# Patient Record
Sex: FEMALE | Race: WHITE | Employment: OTHER | URBAN - METROPOLITAN AREA
[De-identification: names, ages, dates, MRNs, and addresses within clinical notes are randomized per-mention and may not be internally consistent; named-entity substitution may affect disease eponyms.]

---

## 2022-07-21 ENCOUNTER — HOSPITAL ENCOUNTER (EMERGENCY)
Age: 87
Discharge: HOME | End: 2022-07-21
Attending: EMERGENCY MEDICINE
Payer: MEDICARE

## 2022-07-21 ENCOUNTER — APPOINTMENT (OUTPATIENT)
Dept: CT IMAGING | Age: 87
End: 2022-07-21
Payer: MEDICARE

## 2022-07-21 ENCOUNTER — APPOINTMENT (OUTPATIENT)
Dept: GENERAL RADIOLOGY | Age: 87
End: 2022-07-21
Payer: MEDICARE

## 2022-07-21 VITALS
WEIGHT: 138 LBS | OXYGEN SATURATION: 97 % | BODY MASS INDEX: 27.09 KG/M2 | TEMPERATURE: 98 F | DIASTOLIC BLOOD PRESSURE: 81 MMHG | RESPIRATION RATE: 17 BRPM | HEIGHT: 60 IN | SYSTOLIC BLOOD PRESSURE: 163 MMHG | HEART RATE: 95 BPM

## 2022-07-21 DIAGNOSIS — S63.501A RIGHT WRIST SPRAIN, INITIAL ENCOUNTER: ICD-10-CM

## 2022-07-21 DIAGNOSIS — S30.0XXA LUMBAR CONTUSION, INITIAL ENCOUNTER: ICD-10-CM

## 2022-07-21 DIAGNOSIS — S09.90XA CLOSED HEAD INJURY, INITIAL ENCOUNTER: Primary | ICD-10-CM

## 2022-07-21 PROCEDURE — 72100 X-RAY EXAM L-S SPINE 2/3 VWS: CPT

## 2022-07-21 PROCEDURE — 99284 EMERGENCY DEPT VISIT MOD MDM: CPT

## 2022-07-21 PROCEDURE — 73110 X-RAY EXAM OF WRIST: CPT

## 2022-07-21 PROCEDURE — 72125 CT NECK SPINE W/O DYE: CPT

## 2022-07-21 PROCEDURE — 72220 X-RAY EXAM SACRUM TAILBONE: CPT

## 2022-07-21 PROCEDURE — 6370000000 HC RX 637 (ALT 250 FOR IP): Performed by: EMERGENCY MEDICINE

## 2022-07-21 PROCEDURE — 70450 CT HEAD/BRAIN W/O DYE: CPT

## 2022-07-21 RX ORDER — HYDROCODONE BITARTRATE AND ACETAMINOPHEN 5; 325 MG/1; MG/1
1 TABLET ORAL
Status: COMPLETED | OUTPATIENT
Start: 2022-07-21 | End: 2022-07-21

## 2022-07-21 RX ORDER — TRAMADOL HYDROCHLORIDE 50 MG/1
50 TABLET ORAL EVERY 8 HOURS PRN
Qty: 11 TABLET | Refills: 0 | Status: SHIPPED | OUTPATIENT
Start: 2022-07-21 | End: 2022-07-25

## 2022-07-21 RX ADMIN — HYDROCODONE BITARTRATE AND ACETAMINOPHEN 1 TABLET: 5; 325 TABLET ORAL at 02:40

## 2022-07-21 ASSESSMENT — PAIN - FUNCTIONAL ASSESSMENT
PAIN_FUNCTIONAL_ASSESSMENT: 0-10
PAIN_FUNCTIONAL_ASSESSMENT: 0-10

## 2022-07-21 ASSESSMENT — PAIN DESCRIPTION - LOCATION
LOCATION: BUTTOCKS
LOCATION_2: WRIST
LOCATION: BUTTOCKS
LOCATION: BUTTOCKS;WRIST
LOCATION_2: WRIST

## 2022-07-21 ASSESSMENT — ENCOUNTER SYMPTOMS
SINUS PAIN: 0
COUGH: 0
BACK PAIN: 0
EYE ITCHING: 0
EYE REDNESS: 0
ABDOMINAL PAIN: 0
EYE PAIN: 0
DIARRHEA: 0
WHEEZING: 0
SHORTNESS OF BREATH: 0
CONSTIPATION: 0
NAUSEA: 0
TROUBLE SWALLOWING: 0
VOMITING: 0

## 2022-07-21 ASSESSMENT — PAIN SCALES - GENERAL
PAINLEVEL_OUTOF10: 8
PAINLEVEL_OUTOF10: 8
PAINLEVEL_OUTOF10: 3

## 2022-07-21 ASSESSMENT — PAIN DESCRIPTION - DESCRIPTORS
DESCRIPTORS: DULL
DESCRIPTORS_2: SHARP
DESCRIPTORS_2: DULL
DESCRIPTORS: SHARP

## 2022-07-21 ASSESSMENT — PAIN DESCRIPTION - ORIENTATION
ORIENTATION_2: RIGHT
ORIENTATION_2: RIGHT

## 2022-07-21 ASSESSMENT — PAIN DESCRIPTION - INTENSITY
RATING_2: 4
RATING_2: 8

## 2022-07-21 NOTE — ED TRIAGE NOTES
Pt fell while getting back in the bed from using the bathroom. Pt states she hit her head, fell on her butt and wrist. Pt complains of buttock and right wrist pain during triage. Denies any LOC at time of incident. Pt denies any head pain at this time. Small bump noted on back of head during triage.  Pt states she is currently on Eliquis for AFib

## 2022-07-21 NOTE — ED PROVIDER NOTES
trouble swallowing. Eyes:  Negative for pain, redness and itching. Respiratory:  Negative for cough, shortness of breath and wheezing. Cardiovascular:  Negative for chest pain and palpitations. Gastrointestinal:  Negative for abdominal pain, constipation, diarrhea, nausea and vomiting. Endocrine: Negative for polydipsia, polyphagia and polyuria. Genitourinary:  Negative for dysuria, flank pain, frequency and hematuria. Musculoskeletal:  Positive for arthralgias. Negative for back pain and myalgias. Skin:  Negative for rash and wound. Allergic/Immunologic: Negative for food allergies and immunocompromised state. Neurological:  Negative for dizziness, loss of consciousness, syncope, speech difficulty, light-headedness and headaches. Hematological:  Negative for adenopathy. Does not bruise/bleed easily. Psychiatric/Behavioral:  Negative for dysphoric mood and self-injury. The patient is not nervous/anxious. All other systems reviewed and are negative. Past Medical History:   Diagnosis Date    Atrial fibrillation ([facility] [address]         No past surgical history on file. No family history on file. Social History     Socioeconomic History    Marital status: Unknown        Allergies: Sulfa antibiotics    Previous Medications    No medications on file        Vitals signs and nursing note reviewed. Patient Vitals for the past 4 hrs:   Temp Pulse Resp BP SpO2   07/21/22 0353 98 °F (36.7 °C) 95 17 (!) 163/81 --   07/21/22 0232 -- -- -- (!) 186/82 97 %   07/21/22 0211 97.9 °F (36.6 °C) (!) 114 20 (!) 162/101 94 %          Physical Exam  Vitals and nursing note reviewed. Exam conducted with a chaperone present. Constitutional:       General: She is in acute distress. Appearance: Normal appearance. She is normal weight. HENT:      Head: Normocephalic.         Right Ear: External ear normal.      Left Ear: External ear normal.      Nose: Nose normal.      Mouth/Throat:      Mouth: Mucous membranes are moist.      Pharynx: Oropharynx is clear. Eyes:      General: No scleral icterus. Extraocular Movements: Extraocular movements intact. Conjunctiva/sclera: Conjunctivae normal.      Pupils: Pupils are equal, round, and reactive to light. Cardiovascular:      Rate and Rhythm: Normal rate and regular rhythm. Pulses: Normal pulses. Heart sounds: Normal heart sounds. Pulmonary:      Effort: Pulmonary effort is normal. No respiratory distress. Breath sounds: Normal breath sounds. Abdominal:      General: Abdomen is flat. Bowel sounds are normal. There is no distension. Palpations: Abdomen is soft. There is no mass. Tenderness: There is no abdominal tenderness. Musculoskeletal:         General: Tenderness present. No deformity or signs of injury. Right wrist: Tenderness and bony tenderness present. Decreased range of motion. Left wrist: Normal.        Arms:       Cervical back: Normal range of motion and neck supple. Back:    Skin:     General: Skin is warm and dry. Capillary Refill: Capillary refill takes less than 2 seconds. Neurological:      General: No focal deficit present. Mental Status: She is alert and oriented to person, place, and time. Psychiatric:         Mood and Affect: Mood normal.         Behavior: Behavior normal.         Thought Content: Thought content normal.         Judgment: Judgment normal.        Procedures        XR LUMBAR SPINE (2-3 VIEWS)   Final Result   Osteopenia and degenerative changes, without evidence of an acute   fracture. XR SACRUM COCCYX (MIN 2 VIEWS)   Final Result   Process. XR WRIST RIGHT (MIN 3 VIEWS)   Final Result   1. No acute process. 2. Osteopenia. 3. 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 CT CERVICAL SPINE WO CONTRAST   Final Result   No evidence of an acute cervical spine injury.       CT HEAD WO CONTRAST   Final Result   Bruising in the left occipital scalp, with no skull fracture or   acute intracranial process. Voice dictation software was used during the making of this note. This software is not perfect and grammatical and other typographical errors may be present. This note has not been completely proofread for errors.      Nicole John MD  07/21/22 4154

## 2022-07-21 NOTE — ED NOTES
I have reviewed discharge instructions with the patient. The patient verbalized understanding. Patient left ED via Discharge Method: wheelchair to Home with family. Opportunity for questions and clarification provided. Patient given 0 scripts. To continue your aftercare when you leave the hospital, you may receive an automated call from our care team to check in on how you are doing. This is a free service and part of our promise to provide the best care and service to meet your aftercare needs.  If you have questions, or wish to unsubscribe from this service please call [phone]. Thank you for Choosing our [facility] Emergency Department.         Merlin Lee RN  07/21/22 2814